# Patient Record
Sex: FEMALE
[De-identification: names, ages, dates, MRNs, and addresses within clinical notes are randomized per-mention and may not be internally consistent; named-entity substitution may affect disease eponyms.]

---

## 2022-03-18 PROBLEM — Z00.00 ENCOUNTER FOR PREVENTIVE HEALTH EXAMINATION: Status: ACTIVE | Noted: 2022-03-18

## 2022-05-06 ENCOUNTER — APPOINTMENT (OUTPATIENT)
Dept: VASCULAR SURGERY | Facility: CLINIC | Age: 62
End: 2022-05-06
Payer: MEDICAID

## 2022-05-06 VITALS
SYSTOLIC BLOOD PRESSURE: 123 MMHG | TEMPERATURE: 97 F | HEIGHT: 63 IN | DIASTOLIC BLOOD PRESSURE: 79 MMHG | HEART RATE: 63 BPM | BODY MASS INDEX: 28.88 KG/M2 | WEIGHT: 163 LBS

## 2022-05-06 DIAGNOSIS — Z78.9 OTHER SPECIFIED HEALTH STATUS: ICD-10-CM

## 2022-05-06 DIAGNOSIS — I87.2 VENOUS INSUFFICIENCY (CHRONIC) (PERIPHERAL): ICD-10-CM

## 2022-05-06 DIAGNOSIS — I78.1 NEVUS, NON-NEOPLASTIC: ICD-10-CM

## 2022-05-06 PROCEDURE — 99212 OFFICE O/P EST SF 10 MIN: CPT

## 2022-05-06 PROCEDURE — 93970 EXTREMITY STUDY: CPT

## 2022-05-06 NOTE — PHYSICAL EXAM
[No Rash or Lesion] : No rash or lesion [Petechiae] : no petechiae [Skin Ulcer] : no ulcer [Skin Induration] : no induration [Alert] : alert [Oriented to Person] : oriented to person [Oriented to Place] : oriented to place [Calm] : calm [de-identified] : well appearing female in NAD  [de-identified] : unlabored respirations [de-identified] : reg rhythm [FreeTextEntry1] : LE vein findings: \par Varicosities - bilateral telangiactasia, reticular and spider veins. no varicose veins. See photos\par Edema none\par Skin changes dry, flaky skin at the gator area.\par Ulcer none\par CEAP classification C1\par Palpable DP pulses bilaterally\par \par

## 2022-05-06 NOTE — ASSESSMENT
[FreeTextEntry1] : Ms. ELAN RIOS is a 62 year with left lower extremity venous insufficiency s/p previous GSV intervention and bilateral telangiactasia, reticular and spider veins. \par Patient is a candidate for cosmetic sclerotherapy.\par The risks, benefits and alternatives of treatment versus continued conservative management were discussed with the patient.  Patient chooses bilateral sclerotherapy Treatment plan to be scheduled. \par \par

## 2022-05-06 NOTE — DATA REVIEWED
[FreeTextEntry1] : Lower extremity venous duplex done today demonstrates venous insufficiency of the left distal great saphenous vein as well as reflux in the tributary veins bilaterally. No evidence of DVT, SVT or deep reflux. .\par

## 2022-05-06 NOTE — HISTORY OF PRESENT ILLNESS
[FreeTextEntry1] : Ms. ELAN RIOS is a 62 year F  who presents for initial evaluation of bilateral leg pain for the past several years. She was self-referred. He daughter is a patient of mine. She is accompanied by her daughter who is translating the visit and both have deferred a .\par Ms. RIOS leg discomfort is associated with leg swelling, fatigue, heaviness, achiness, and itchiness. \par Her symptoms have persisted despite conservative management with leg elevation, exercise, attempted weight loss, over-the-counter medications (ibuprofen), and compression stocking use for more than 3 months. \par Nothing helps to alleviate patient's symptoms. \par \par Previous treatment, vein procedures and vein surgeries include: wearing compression stockings for more than 3 months with little or no relief, vein injections (she is unsure where and when she had them or which leg),  vein surgery with laser (unable to recall which leg and when and where it was done).\par \par Ms. RIOS denies history of DVT/SVT/PE or other thromboembolic disease. She denies history of lower extremity claudication, rest pain, or tissue loss. She denies CAD, MI, DM, CRI, CVA, or TIA.\par \par PMH significant for venous insufficinecy and breast augmentation. \par Meds none\par NKDA\par \par

## 2022-06-15 ENCOUNTER — APPOINTMENT (OUTPATIENT)
Dept: VASCULAR SURGERY | Facility: CLINIC | Age: 62
End: 2022-06-15